# Patient Record
Sex: FEMALE | Race: OTHER | NOT HISPANIC OR LATINO
[De-identification: names, ages, dates, MRNs, and addresses within clinical notes are randomized per-mention and may not be internally consistent; named-entity substitution may affect disease eponyms.]

---

## 2017-10-10 PROBLEM — Z00.00 ENCOUNTER FOR PREVENTIVE HEALTH EXAMINATION: Status: ACTIVE | Noted: 2017-10-10

## 2017-10-11 ENCOUNTER — APPOINTMENT (OUTPATIENT)
Dept: CARDIOLOGY | Facility: CLINIC | Age: 82
End: 2017-10-11
Payer: MEDICAID

## 2017-10-11 VITALS
HEIGHT: 61 IN | HEART RATE: 72 BPM | SYSTOLIC BLOOD PRESSURE: 171 MMHG | OXYGEN SATURATION: 98 % | BODY MASS INDEX: 23.03 KG/M2 | WEIGHT: 122 LBS | RESPIRATION RATE: 14 BRPM | DIASTOLIC BLOOD PRESSURE: 74 MMHG

## 2017-10-11 PROCEDURE — 36415 COLL VENOUS BLD VENIPUNCTURE: CPT

## 2017-10-11 PROCEDURE — 93000 ELECTROCARDIOGRAM COMPLETE: CPT

## 2017-10-11 PROCEDURE — 99205 OFFICE O/P NEW HI 60 MIN: CPT

## 2017-10-11 RX ORDER — GEMFIBROZIL 600 MG/1
600 TABLET, FILM COATED ORAL
Qty: 60 | Refills: 0 | Status: ACTIVE | COMMUNITY
Start: 2017-10-11

## 2017-10-11 RX ORDER — AMLODIPINE BESYLATE 5 MG/1
5 TABLET ORAL
Qty: 30 | Refills: 5 | Status: DISCONTINUED | COMMUNITY
Start: 2017-10-11 | End: 2017-10-11

## 2017-10-12 ENCOUNTER — MEDICATION RENEWAL (OUTPATIENT)
Age: 82
End: 2017-10-12

## 2017-10-12 ENCOUNTER — RESULT REVIEW (OUTPATIENT)
Age: 82
End: 2017-10-12

## 2017-10-12 LAB
ALBUMIN SERPL ELPH-MCNC: 4.3 G/DL
ALP BLD-CCNC: 91 U/L
ALT SERPL-CCNC: 17 U/L
ANION GAP SERPL CALC-SCNC: 19 MMOL/L
AST SERPL-CCNC: 27 U/L
BILIRUB SERPL-MCNC: 0.4 MG/DL
BUN SERPL-MCNC: 15 MG/DL
CALCIUM SERPL-MCNC: 9.7 MG/DL
CHLORIDE SERPL-SCNC: 101 MMOL/L
CO2 SERPL-SCNC: 21 MMOL/L
CREAT SERPL-MCNC: 0.67 MG/DL
GLUCOSE SERPL-MCNC: 88 MG/DL
POTASSIUM SERPL-SCNC: 3.9 MMOL/L
PROT SERPL-MCNC: 7.9 G/DL
SODIUM SERPL-SCNC: 141 MMOL/L
TSH SERPL-ACNC: 1.57 UIU/ML

## 2017-10-13 ENCOUNTER — RESULT REVIEW (OUTPATIENT)
Age: 82
End: 2017-10-13

## 2017-10-13 LAB — NT-PROBNP SERPL-MCNC: 48 PG/ML

## 2017-11-06 ENCOUNTER — APPOINTMENT (OUTPATIENT)
Dept: CV DIAGNOSITCS | Facility: HOSPITAL | Age: 82
End: 2017-11-06

## 2017-11-06 ENCOUNTER — APPOINTMENT (OUTPATIENT)
Dept: CARDIOLOGY | Facility: CLINIC | Age: 82
End: 2017-11-06
Payer: MEDICAID

## 2017-11-06 VITALS
SYSTOLIC BLOOD PRESSURE: 162 MMHG | WEIGHT: 293 LBS | DIASTOLIC BLOOD PRESSURE: 77 MMHG | HEIGHT: 61 IN | RESPIRATION RATE: 14 BRPM | BODY MASS INDEX: 55.32 KG/M2 | HEART RATE: 71 BPM | OXYGEN SATURATION: 99 %

## 2017-11-06 VITALS — WEIGHT: 120 LBS | BODY MASS INDEX: 22.67 KG/M2

## 2017-11-06 PROCEDURE — 99214 OFFICE O/P EST MOD 30 MIN: CPT

## 2017-11-06 RX ORDER — VALSARTAN AND HYDROCHLOROTHIAZIDE 160; 25 MG/1; MG/1
160-25 TABLET, FILM COATED ORAL DAILY
Qty: 30 | Refills: 3 | Status: DISCONTINUED | COMMUNITY
Start: 2017-10-11 | End: 2017-11-06

## 2017-12-21 ENCOUNTER — APPOINTMENT (OUTPATIENT)
Dept: CARDIOLOGY | Facility: CLINIC | Age: 82
End: 2017-12-21
Payer: MEDICAID

## 2017-12-21 VITALS
RESPIRATION RATE: 14 BRPM | DIASTOLIC BLOOD PRESSURE: 80 MMHG | OXYGEN SATURATION: 97 % | BODY MASS INDEX: 22.84 KG/M2 | WEIGHT: 121 LBS | SYSTOLIC BLOOD PRESSURE: 180 MMHG | HEIGHT: 61 IN | HEART RATE: 70 BPM

## 2017-12-21 PROCEDURE — 93000 ELECTROCARDIOGRAM COMPLETE: CPT

## 2017-12-21 PROCEDURE — 99214 OFFICE O/P EST MOD 30 MIN: CPT

## 2018-02-01 ENCOUNTER — APPOINTMENT (OUTPATIENT)
Dept: CARDIOLOGY | Facility: CLINIC | Age: 83
End: 2018-02-01

## 2018-03-01 ENCOUNTER — APPOINTMENT (OUTPATIENT)
Dept: CARDIOLOGY | Facility: CLINIC | Age: 83
End: 2018-03-01
Payer: MEDICAID

## 2018-03-01 VITALS
WEIGHT: 118 LBS | HEIGHT: 61 IN | SYSTOLIC BLOOD PRESSURE: 169 MMHG | DIASTOLIC BLOOD PRESSURE: 78 MMHG | HEART RATE: 61 BPM | OXYGEN SATURATION: 96 % | BODY MASS INDEX: 22.28 KG/M2

## 2018-03-01 DIAGNOSIS — R06.02 SHORTNESS OF BREATH: ICD-10-CM

## 2018-03-01 PROCEDURE — 99214 OFFICE O/P EST MOD 30 MIN: CPT

## 2018-03-01 RX ORDER — METOPROLOL TARTRATE 50 MG/1
50 TABLET, FILM COATED ORAL DAILY
Refills: 0 | Status: DISCONTINUED | COMMUNITY
Start: 2017-11-06 | End: 2018-03-01

## 2018-06-04 ENCOUNTER — NON-APPOINTMENT (OUTPATIENT)
Age: 83
End: 2018-06-04

## 2018-06-04 ENCOUNTER — APPOINTMENT (OUTPATIENT)
Dept: CARDIOLOGY | Facility: CLINIC | Age: 83
End: 2018-06-04
Payer: MEDICAID

## 2018-06-04 VITALS
SYSTOLIC BLOOD PRESSURE: 168 MMHG | DIASTOLIC BLOOD PRESSURE: 79 MMHG | HEART RATE: 68 BPM | OXYGEN SATURATION: 98 % | WEIGHT: 115 LBS | BODY MASS INDEX: 21.71 KG/M2 | HEIGHT: 61 IN

## 2018-06-04 VITALS — SYSTOLIC BLOOD PRESSURE: 150 MMHG | DIASTOLIC BLOOD PRESSURE: 68 MMHG

## 2018-06-04 PROCEDURE — 99214 OFFICE O/P EST MOD 30 MIN: CPT

## 2018-06-04 PROCEDURE — 93000 ELECTROCARDIOGRAM COMPLETE: CPT

## 2018-09-10 ENCOUNTER — NON-APPOINTMENT (OUTPATIENT)
Age: 83
End: 2018-09-10

## 2018-09-10 ENCOUNTER — APPOINTMENT (OUTPATIENT)
Dept: CARDIOLOGY | Facility: CLINIC | Age: 83
End: 2018-09-10
Payer: MEDICAID

## 2018-09-10 VITALS
BODY MASS INDEX: 22.58 KG/M2 | WEIGHT: 115 LBS | DIASTOLIC BLOOD PRESSURE: 80 MMHG | OXYGEN SATURATION: 97 % | SYSTOLIC BLOOD PRESSURE: 170 MMHG | HEART RATE: 73 BPM | RESPIRATION RATE: 16 BRPM | HEIGHT: 60 IN | TEMPERATURE: 97.5 F

## 2018-09-10 VITALS — DIASTOLIC BLOOD PRESSURE: 79 MMHG | SYSTOLIC BLOOD PRESSURE: 129 MMHG

## 2018-09-10 PROCEDURE — 99214 OFFICE O/P EST MOD 30 MIN: CPT

## 2018-09-10 PROCEDURE — 93000 ELECTROCARDIOGRAM COMPLETE: CPT

## 2018-11-27 ENCOUNTER — MEDICATION RENEWAL (OUTPATIENT)
Age: 83
End: 2018-11-27

## 2019-03-18 ENCOUNTER — APPOINTMENT (OUTPATIENT)
Age: 84
End: 2019-03-18

## 2019-03-18 ENCOUNTER — APPOINTMENT (OUTPATIENT)
Age: 84
End: 2019-03-18
Payer: MEDICAID

## 2019-03-18 ENCOUNTER — NON-APPOINTMENT (OUTPATIENT)
Age: 84
End: 2019-03-18

## 2019-03-18 ENCOUNTER — APPOINTMENT (OUTPATIENT)
Dept: CARDIOLOGY | Facility: CLINIC | Age: 84
End: 2019-03-18
Payer: MEDICAID

## 2019-03-18 ENCOUNTER — OUTPATIENT (OUTPATIENT)
Dept: OUTPATIENT SERVICES | Facility: HOSPITAL | Age: 84
LOS: 1 days | End: 2019-03-18
Payer: MEDICAID

## 2019-03-18 VITALS
HEART RATE: 56 BPM | WEIGHT: 113.25 LBS | HEIGHT: 60 IN | DIASTOLIC BLOOD PRESSURE: 79 MMHG | BODY MASS INDEX: 22.23 KG/M2 | SYSTOLIC BLOOD PRESSURE: 201 MMHG | OXYGEN SATURATION: 100 %

## 2019-03-18 VITALS — SYSTOLIC BLOOD PRESSURE: 201 MMHG | DIASTOLIC BLOOD PRESSURE: 94 MMHG

## 2019-03-18 DIAGNOSIS — R42 DIZZINESS AND GIDDINESS: ICD-10-CM

## 2019-03-18 DIAGNOSIS — Z86.39 PERSONAL HISTORY OF OTHER ENDOCRINE, NUTRITIONAL AND METABOLIC DISEASE: ICD-10-CM

## 2019-03-18 DIAGNOSIS — R00.1 BRADYCARDIA, UNSPECIFIED: ICD-10-CM

## 2019-03-18 DIAGNOSIS — K21.9 GASTRO-ESOPHAGEAL REFLUX DISEASE W/OUT ESOPHAGITIS: ICD-10-CM

## 2019-03-18 DIAGNOSIS — Z87.39 PERSONAL HISTORY OF OTHER DISEASES OF THE MUSCULOSKELETAL SYSTEM AND CONNECTIVE TISSUE: ICD-10-CM

## 2019-03-18 PROCEDURE — 99214 OFFICE O/P EST MOD 30 MIN: CPT

## 2019-03-18 PROCEDURE — 93227 XTRNL ECG REC<48 HR R&I: CPT

## 2019-03-18 PROCEDURE — 93000 ELECTROCARDIOGRAM COMPLETE: CPT

## 2019-03-18 PROCEDURE — 36415 COLL VENOUS BLD VENIPUNCTURE: CPT

## 2019-03-18 PROCEDURE — 99205 OFFICE O/P NEW HI 60 MIN: CPT

## 2019-03-18 NOTE — PHYSICAL EXAM
[General Appearance - Well Developed] : well developed [Normal Appearance] : normal appearance [Well Groomed] : well groomed [General Appearance - Well Nourished] : well nourished [No Deformities] : no deformities [General Appearance - In No Acute Distress] : no acute distress [Normal Conjunctiva] : the conjunctiva exhibited no abnormalities [Eyelids - No Xanthelasma] : the eyelids demonstrated no xanthelasmas [No Oral Pallor] : no oral pallor [Normal Oral Mucosa] : normal oral mucosa [No Oral Cyanosis] : no oral cyanosis [Normal Jugular Venous A Waves Present] : normal jugular venous A waves present [Normal Jugular Venous V Waves Present] : normal jugular venous V waves present [No Jugular Venous Correa A Waves] : no jugular venous correa A waves [Heart Sounds] : normal S1 and S2 [Heart Rate And Rhythm] : heart rate and rhythm were normal [Murmurs] : no murmurs present [Respiration, Rhythm And Depth] : normal respiratory rhythm and effort [Exaggerated Use Of Accessory Muscles For Inspiration] : no accessory muscle use [Abdomen Soft] : soft [Auscultation Breath Sounds / Voice Sounds] : lungs were clear to auscultation bilaterally [Abdomen Tenderness] : non-tender [Abdomen Mass (___ Cm)] : no abdominal mass palpated [Gait - Sufficient For Exercise Testing] : the gait was sufficient for exercise testing [Abnormal Walk] : normal gait [Nail Clubbing] : no clubbing of the fingernails [Cyanosis, Localized] : no localized cyanosis [Petechial Hemorrhages (___cm)] : no petechial hemorrhages [Skin Color & Pigmentation] : normal skin color and pigmentation [] : no rash [Skin Lesions] : no skin lesions [No Venous Stasis] : no venous stasis [No Skin Ulcers] : no skin ulcer [No Xanthoma] : no  xanthoma was observed [Oriented To Time, Place, And Person] : oriented to person, place, and time [Affect] : the affect was normal [No Anxiety] : not feeling anxious [Mood] : the mood was normal

## 2019-03-18 NOTE — REVIEW OF SYSTEMS
[Dyspnea on exertion] : dyspnea during exertion [Feeling Fatigued] : feeling fatigued [Dizziness] : dizziness [Negative] : Heme/Lymph

## 2019-03-18 NOTE — HISTORY OF PRESENT ILLNESS
[FreeTextEntry1] : Brandin Doe MD\par \par Kat Ruiz is an 84y/o woman with Hx of HTN, HLD, hypothyroidism, all of which are stable, arthritis, GERD, and new onset dizziness who presents today for initial evaluation. Currently in office with symptoms of dizziness while seeing the Heart Failure team. EKG at time shows marked sinus bradycardia with sinus pause. Also notes worsening dyspnea on exertion, unsure if secondary to heart failure (awaiting recent Echo results from primary) or possible chronotropic incompetence. Denies chest pain, palpitations, SOB at rest, syncope or near syncope. \par

## 2019-03-19 VITALS — HEART RATE: 48 BPM | SYSTOLIC BLOOD PRESSURE: 172 MMHG | DIASTOLIC BLOOD PRESSURE: 80 MMHG

## 2019-03-19 PROBLEM — R42 DIZZINESS: Status: ACTIVE | Noted: 2019-03-18

## 2019-03-19 NOTE — REVIEW OF SYSTEMS
[Negative] : Integumentary [see HPI] : see HPI [Recent Weight Loss (___ Lbs)] : recent [unfilled] ~Ulb weight loss [Dyspnea on exertion] : dyspnea during exertion [Dizziness] : dizziness [Shortness Of Breath] : no shortness of breath [FreeTextEntry1] : constipation

## 2019-03-19 NOTE — DISCUSSION/SUMMARY
[Patient] : the patient [___ Week(s)] : [unfilled] week(s) [FreeTextEntry2] : son [FreeTextEntry1] : 1. Sinus bradycardia- EKG with marked sinus bradycardia with sinus pause/exit block and reports new onset of dizziness. EP appreciated and arranged for a 24 hour Holter monitor to R/O bradyarrhythmia, may need a PPM pacement for sinus node dysfunction/symptomatic bradycardia\par \par 2. Dyspnea on exertion\par - will repeat TTE to assess valvular function and LVEF, will obtain 2017 TTE from primary Dr. Swift\par \par 3. Hypertension- uncontrolled with elevated readings. Pt was taking amlodipine 5 mg, not 7.5 as per son\par - Discussed with Dr. Doe and will add losartan 25 mg daily and will follow up for B/P check with Dr. Swift and in office after testing.\par \par 4. Hypothyroid-  on levothyroxine 50 mcg daily, will follow up for recheck.  WIll follow up with primary to check TSH\par \par Follow up after testing and EP will call to discuss Holter results and need for PPM.\par \par Follow up in 6 months\par \par \par

## 2019-03-19 NOTE — HISTORY OF PRESENT ILLNESS
[FreeTextEntry1] : Kat Ruiz is an 86 y/o female with a PMHx of HTN, HLD, hypothyroid, arthritis and acid reflux. No surgical history. No history of MI or stroke. She speaks Wesley/ Gonzalo, but the son helped translate.  Referred to HF clinic from Dr. Sandra Laurent.\par \par TTE 11/6/17 \par \par Previously,she was started on Valsartan 160mg/HCTZ 25 mg daily  but stopped after a few days due to headaches she associated with the medication and she went back to amlodipine 7.5 mg daily and metoprolol tartrate 50 mg daily . \par \par Pt is here for follow up today. She states she has been feeling well. Her initial B/P was 201/94 HR 56 and with recheck it was 172/80 with HR 45-55bpm and she took her amlodipine 5 mg this morning. Pt has been taking 5 mg, not 7.5 mg. Her son translates and she says she has had new onset of dizziness and has had increased dyspnea on exertion walking room to room in the house. Previously, she could walk up a flight of stairs and could walk without dyspnea. She uses 1 pillow for sleep.   No orthopnea (she likes to lie flat w/o a pillow). She reports normal bowel and bladder function. Denies PND/orthopnea,, palpitations and no syncope/falls. EKG today shows marked sinus bradycardia rate 48 with a sinus pause and pt was referred to and was seen by EP today Dr. Negro.

## 2019-03-19 NOTE — PHYSICAL EXAM
[General Appearance - Well Developed] : well developed [Normal Appearance] : normal appearance [Well Groomed] : well groomed [No Deformities] : no deformities [General Appearance - In No Acute Distress] : no acute distress [Normal Conjunctiva] : the conjunctiva exhibited no abnormalities [Normal Oral Mucosa] : normal oral mucosa [No Oral Pallor] : no oral pallor [No Oral Cyanosis] : no oral cyanosis [Normal Jugular Venous A Waves Present] : normal jugular venous A waves present [Normal Jugular Venous V Waves Present] : normal jugular venous V waves present [No Jugular Venous Correa A Waves] : no jugular venous correa A waves [Respiration, Rhythm And Depth] : normal respiratory rhythm and effort [Exaggerated Use Of Accessory Muscles For Inspiration] : no accessory muscle use [Auscultation Breath Sounds / Voice Sounds] : lungs were clear to auscultation bilaterally [Heart Sounds] : normal S1 and S2 [Edema] : no peripheral edema present [Bowel Sounds] : normal bowel sounds [Abdomen Soft] : soft [Abdomen Tenderness] : non-tender [Abnormal Walk] : normal gait [Nail Clubbing] : no clubbing of the fingernails [Cyanosis, Localized] : no localized cyanosis [Skin Color & Pigmentation] : normal skin color and pigmentation [] : no rash [No Venous Stasis] : no venous stasis [Skin Lesions] : no skin lesions [No Skin Ulcers] : no skin ulcer [No Xanthoma] : no  xanthoma was observed [Oriented To Time, Place, And Person] : oriented to person, place, and time [Affect] : the affect was normal [FreeTextEntry1] : no edema

## 2019-03-19 NOTE — ASSESSMENT
[FreeTextEntry1] : 83 y/o female with PMHx of HTN, HLD, hypothyroid, arthritis and acid reflux. No surgical history. No history of MI or stroke. She had a TTE  11/6/17 ( Dr. Swift)\par Appears euvolemic but hypertensive with B/P 201/79 to 172/80 and new onset of ROBLERO and dizziness

## 2019-03-20 LAB
ALBUMIN SERPL ELPH-MCNC: 4.3 G/DL
ALP BLD-CCNC: 92 U/L
ALT SERPL-CCNC: 13 U/L
ANION GAP SERPL CALC-SCNC: 17 MMOL/L
AST SERPL-CCNC: 21 U/L
BASOPHILS # BLD AUTO: 0.02 K/UL
BASOPHILS NFR BLD AUTO: 0.4 %
BILIRUB SERPL-MCNC: 0.4 MG/DL
BUN SERPL-MCNC: 12 MG/DL
CALCIUM SERPL-MCNC: 9.2 MG/DL
CHLORIDE SERPL-SCNC: 98 MMOL/L
CO2 SERPL-SCNC: 24 MMOL/L
CREAT SERPL-MCNC: 0.59 MG/DL
EOSINOPHIL # BLD AUTO: 0.07 K/UL
EOSINOPHIL NFR BLD AUTO: 1.3 %
GLUCOSE SERPL-MCNC: 69 MG/DL
HCT VFR BLD CALC: 40.4 %
HGB BLD-MCNC: 12.6 G/DL
IMM GRANULOCYTES NFR BLD AUTO: 0.2 %
LYMPHOCYTES # BLD AUTO: 1.6 K/UL
LYMPHOCYTES NFR BLD AUTO: 30.5 %
MAGNESIUM SERPL-MCNC: 2.2 MG/DL
MAN DIFF?: NORMAL
MCHC RBC-ENTMCNC: 30.1 PG
MCHC RBC-ENTMCNC: 31.2 GM/DL
MCV RBC AUTO: 96.7 FL
MONOCYTES # BLD AUTO: 0.61 K/UL
MONOCYTES NFR BLD AUTO: 11.6 %
NEUTROPHILS # BLD AUTO: 2.94 K/UL
NEUTROPHILS NFR BLD AUTO: 56 %
NT-PROBNP SERPL-MCNC: 167 PG/ML
PLATELET # BLD AUTO: 382 K/UL
POTASSIUM SERPL-SCNC: 4.5 MMOL/L
PROT SERPL-MCNC: 7.2 G/DL
RBC # BLD: 4.18 M/UL
RBC # FLD: 14.2 %
SODIUM SERPL-SCNC: 139 MMOL/L
TSH SERPL-ACNC: 1.37 UIU/ML
WBC # FLD AUTO: 5.25 K/UL

## 2019-04-04 ENCOUNTER — NON-APPOINTMENT (OUTPATIENT)
Age: 84
End: 2019-04-04

## 2019-04-15 ENCOUNTER — APPOINTMENT (OUTPATIENT)
Dept: CARDIOLOGY | Facility: CLINIC | Age: 84
End: 2019-04-15
Payer: MEDICAID

## 2019-04-15 VITALS
DIASTOLIC BLOOD PRESSURE: 84 MMHG | HEART RATE: 75 BPM | SYSTOLIC BLOOD PRESSURE: 157 MMHG | OXYGEN SATURATION: 98 % | HEIGHT: 60 IN | BODY MASS INDEX: 22.28 KG/M2 | WEIGHT: 113.5 LBS

## 2019-04-15 DIAGNOSIS — I45.5 OTHER SPECIFIED HEART BLOCK: ICD-10-CM

## 2019-04-15 PROCEDURE — 99214 OFFICE O/P EST MOD 30 MIN: CPT

## 2019-04-15 PROCEDURE — 93000 ELECTROCARDIOGRAM COMPLETE: CPT

## 2019-04-15 NOTE — ASSESSMENT
[FreeTextEntry1] : 85 y/o female with PMHx of HTN, HLD, hypothyroid, arthritis and acid reflux. No surgical history. No history of MI or stroke. She had a TTE  11/6/17 ( Dr. Swift)\par Appears euvolemic but hypertensive with B/P 157/84, improved since last visit with no further ROBLERO and decreased episodes of dizziness

## 2019-04-15 NOTE — HISTORY OF PRESENT ILLNESS
[FreeTextEntry1] : Kat Ruiz is an 84 y/o female with a PMHx of HTN, HLD, hypothyroid, arthritis and acid reflux. No surgical history. No history of MI or stroke. She speaks Wesley/ Gonzalo, but the son helped translate.  Referred to HF clinic from Dr. Sandra Laurent. TTE with primary 8/1/2018 with primary with LVEF 65%, mildly dilated LA\par \par Previously,she was started on Valsartan 160mg/HCTZ 25 mg daily  but stopped after a few days due to headaches she associated with the medication and she went back to amlodipine 7.5 mg daily and metoprolol tartrate 50 mg daily . \par \par Pt is here for follow up today and was seen with Dr. Doe. Last visit on 3/1/8/19 her B/P was 201/94 HR 56 and with recheck it was 172/80 with taking her amlodipine 5 mg  HR 45-55 bpm with marked bradycardia with sinus exit block on EKG and she was seen by EP Dr. Negro who ordered a Holter monitor. Her son translated and said she  had new onset of dizziness and has had increased dyspnea on exertion walking room to room in the house but is no longer having the symptoms since the last visit. Holter with HR 29-75 with sinus bradycardia with sinus exit block, rare PAC's and AT. Prior TTE with primary 8/1/2018 with primary with LVEF 65%, mildly dilated LA . She is walking 2 miles a day in the morning and can walk up a flight of stairs without dyspnea. She uses 1 pillow for sleep.   No orthopnea (she likes to lie flat w/o a pillow). She reports normal bowel and bladder function. Denies PND/orthopnea,, palpitations and no syncope/falls. EKG today with NSR 72 with RBBB from prior 3/18 with marked sinus bradycardia rate 48 with a sinus pause. B/P today 157/84.

## 2019-04-15 NOTE — PHYSICAL EXAM
[General Appearance - Well Developed] : well developed [Normal Appearance] : normal appearance [Well Groomed] : well groomed [No Deformities] : no deformities [General Appearance - In No Acute Distress] : no acute distress [No Oral Pallor] : no oral pallor [Normal Oral Mucosa] : normal oral mucosa [Normal Conjunctiva] : the conjunctiva exhibited no abnormalities [No Oral Cyanosis] : no oral cyanosis [Normal Jugular Venous A Waves Present] : normal jugular venous A waves present [Normal Jugular Venous V Waves Present] : normal jugular venous V waves present [No Jugular Venous Correa A Waves] : no jugular venous correa A waves [Respiration, Rhythm And Depth] : normal respiratory rhythm and effort [Auscultation Breath Sounds / Voice Sounds] : lungs were clear to auscultation bilaterally [Exaggerated Use Of Accessory Muscles For Inspiration] : no accessory muscle use [Edema] : no peripheral edema present [Heart Sounds] : normal S1 and S2 [Abdomen Soft] : soft [Bowel Sounds] : normal bowel sounds [Abdomen Tenderness] : non-tender [Abnormal Walk] : normal gait [Nail Clubbing] : no clubbing of the fingernails [Cyanosis, Localized] : no localized cyanosis [] : no rash [Skin Color & Pigmentation] : normal skin color and pigmentation [No Venous Stasis] : no venous stasis [No Skin Ulcers] : no skin ulcer [Skin Lesions] : no skin lesions [No Xanthoma] : no  xanthoma was observed [Oriented To Time, Place, And Person] : oriented to person, place, and time [Affect] : the affect was normal [Heart Rate And Rhythm] : heart rate and rhythm were normal [FreeTextEntry1] : Speaks Gonzalo

## 2019-04-15 NOTE — DISCUSSION/SUMMARY
[Patient] : the patient [___ Month(s)] : [unfilled] month(s) [FreeTextEntry2] : son [FreeTextEntry1] : 1. Sinus bradycardia- EKG today with NSR 72 with prior EKG with marked sinus bradycardia with sinus pause/exit block and reports new onset of dizziness. EP appreciated and arranged for a 24 hour Holter monitor. Pt feels much better since last visit with no ROBLERO and decreased dizziness and will not have a PPM at this time. Son will call if she has any recurrence of her prior symptoms or if she has any falls.\par \par 2. TTE  obtained from primary Dr. Swift 8/11/19 with LVEF 65%, mild dilated LA \par \par 3. Hypertension- B/p today improved from last visit and is 157/84 today on  Pt was taking amlodipine 5 mg and losartan 25 mg daily.\par - will increase losartan to 50 mg daily and will have a B/P check with her primary Dr. Swift\par \par 4. Hypothyroid-  on levothyroxine 50 mcg daily\par \par Follow up in 3 months or sooner if she has recurrence of symptoms.\par \par \par

## 2019-05-01 ENCOUNTER — MEDICATION RENEWAL (OUTPATIENT)
Age: 84
End: 2019-05-01

## 2019-07-15 ENCOUNTER — APPOINTMENT (OUTPATIENT)
Dept: CARDIOLOGY | Facility: CLINIC | Age: 84
End: 2019-07-15
Payer: MEDICAID

## 2019-07-15 ENCOUNTER — NON-APPOINTMENT (OUTPATIENT)
Age: 84
End: 2019-07-15

## 2019-07-15 VITALS
HEART RATE: 54 BPM | OXYGEN SATURATION: 97 % | SYSTOLIC BLOOD PRESSURE: 208 MMHG | WEIGHT: 116.25 LBS | DIASTOLIC BLOOD PRESSURE: 90 MMHG | HEIGHT: 60 IN | BODY MASS INDEX: 22.82 KG/M2

## 2019-07-15 VITALS — DIASTOLIC BLOOD PRESSURE: 90 MMHG | SYSTOLIC BLOOD PRESSURE: 204 MMHG

## 2019-07-15 PROCEDURE — 99214 OFFICE O/P EST MOD 30 MIN: CPT

## 2019-07-15 PROCEDURE — 93000 ELECTROCARDIOGRAM COMPLETE: CPT

## 2019-07-15 NOTE — HISTORY OF PRESENT ILLNESS
[FreeTextEntry1] : Kat Ruiz is an 86 y/o female with a PMHx of HTN, HLD, hypothyroid, arthritis and acid reflux. No surgical history. No history of MI or stroke. She speaks Wesley/ Gonzalo, but the son helped translate.  Referred to HF clinic from Dr. Sandra Laurent. TTE with primary 8/1/2018 with primary with LVEF 65%, mildly dilated LA\par \par Previously,she was started on Valsartan 160mg/HCTZ 25 mg daily  but stopped after a few days due to headaches she associated with the medication and she went back to amlodipine 7.5 mg daily and metoprolol tartrate 50 mg daily . \par \par Pt is here for follow up today. She brought in her meds and has been taking; metoprolol 100 mg daily, amlodipine 10 mg daily, losartan 50 mg daily. Her B/P today is 204/90 and HR is 54 bpm. Previously, she had an EKG with 45-55 bpm with marked bradycardia with sinus exit block on EKG and she was seen by EP Dr. Negro who ordered a Holter monitor with HR 29-75 with sinus bradycardia with sinus exit block, rare PAC's and AT. Prior TTE with primary 8/1/2018 with primary with LVEF 65%, mildly dilated LA . She is walking 2 miles a day in the morning and can walk up a flight of stairs without dyspnea. She uses 1 pillow for sleep.   No orthopnea (she likes to lie flat w/o a pillow). She reports normal bowel and bladder function. Denies PND/orthopnea,, palpitations and no syncope/falls.

## 2019-07-15 NOTE — PHYSICAL EXAM
[General Appearance - Well Developed] : well developed [Normal Appearance] : normal appearance [Well Groomed] : well groomed [No Deformities] : no deformities [General Appearance - In No Acute Distress] : no acute distress [Normal Conjunctiva] : the conjunctiva exhibited no abnormalities [Normal Oral Mucosa] : normal oral mucosa [No Oral Pallor] : no oral pallor [No Oral Cyanosis] : no oral cyanosis [Normal Jugular Venous A Waves Present] : normal jugular venous A waves present [Normal Jugular Venous V Waves Present] : normal jugular venous V waves present [No Jugular Venous Correa A Waves] : no jugular venous correa A waves [Respiration, Rhythm And Depth] : normal respiratory rhythm and effort [Exaggerated Use Of Accessory Muscles For Inspiration] : no accessory muscle use [Auscultation Breath Sounds / Voice Sounds] : lungs were clear to auscultation bilaterally [Heart Rate And Rhythm] : heart rate and rhythm were normal [Heart Sounds] : normal S1 and S2 [Edema] : no peripheral edema present [Bowel Sounds] : normal bowel sounds [Abdomen Soft] : soft [Abdomen Tenderness] : non-tender [Abnormal Walk] : normal gait [Nail Clubbing] : no clubbing of the fingernails [Cyanosis, Localized] : no localized cyanosis [Skin Color & Pigmentation] : normal skin color and pigmentation [] : no rash [No Venous Stasis] : no venous stasis [Skin Lesions] : no skin lesions [No Skin Ulcers] : no skin ulcer [No Xanthoma] : no  xanthoma was observed [Oriented To Time, Place, And Person] : oriented to person, place, and time [Affect] : the affect was normal [FreeTextEntry1] : Speaks [place]

## 2019-07-15 NOTE — ASSESSMENT
[FreeTextEntry1] : 83 y/o female with PMHx of HTN, HLD, hypothyroid, arthritis and acid reflux. No surgical history. No history of MI or stroke. She had a TTE  11/6/17 ( Dr. Swift)\par Appears euvolemic but hypertensive with B/P 204/90 HR 54 bpm

## 2019-07-15 NOTE — DISCUSSION/SUMMARY
[Patient] : the patient [___ Week(s)] : [unfilled] week(s) [FreeTextEntry2] : son [FreeTextEntry1] : 1. Sinus bradycardia- EKG today with sinus bradycardia rate 54, RBBB\par - will d/c metoprolol succinate 100 mg daily\par \par 2. TTE  obtained from primary Dr. Swift 8/11/19 with LVEF 65%, mild dilated LA \par \par 3. Hypertension- B/p elevated today 204/90\par -  will continue taking amlodipine 10 mg daily\par - increase losartan to 100 mg daily from 50 mg daily and will recheck labs and B/P with her primary Dr. Swift next week.\par - start hydralazine 25 mg bid, goal B/P < 130/80\par \par 4. Hypothyroid-  on levothyroxine 50 mcg daily\par \par Follow up in one week with primary for B/P check and labs and in office in 4-6 weeks. \par \par \par

## 2019-07-15 NOTE — REVIEW OF SYSTEMS
[see HPI] : see HPI [Negative] : Integumentary [Recent Weight Gain (___ Lbs)] : recent [unfilled] ~Ulb weight gain [Shortness Of Breath] : no shortness of breath

## 2019-09-19 ENCOUNTER — APPOINTMENT (OUTPATIENT)
Dept: CV DIAGNOSITCS | Facility: HOSPITAL | Age: 84
End: 2019-09-19
Payer: MEDICAID

## 2019-09-19 ENCOUNTER — NON-APPOINTMENT (OUTPATIENT)
Age: 84
End: 2019-09-19

## 2019-09-19 ENCOUNTER — APPOINTMENT (OUTPATIENT)
Dept: CARDIOLOGY | Facility: CLINIC | Age: 84
End: 2019-09-19
Payer: MEDICAID

## 2019-09-19 ENCOUNTER — OUTPATIENT (OUTPATIENT)
Dept: OUTPATIENT SERVICES | Facility: HOSPITAL | Age: 84
LOS: 1 days | End: 2019-09-19

## 2019-09-19 VITALS
HEART RATE: 60 BPM | BODY MASS INDEX: 24.54 KG/M2 | OXYGEN SATURATION: 97 % | HEIGHT: 60 IN | SYSTOLIC BLOOD PRESSURE: 170 MMHG | DIASTOLIC BLOOD PRESSURE: 84 MMHG | RESPIRATION RATE: 16 BRPM | WEIGHT: 125 LBS

## 2019-09-19 VITALS — SYSTOLIC BLOOD PRESSURE: 154 MMHG | DIASTOLIC BLOOD PRESSURE: 74 MMHG

## 2019-09-19 DIAGNOSIS — I10 ESSENTIAL (PRIMARY) HYPERTENSION: ICD-10-CM

## 2019-09-19 PROCEDURE — 93306 TTE W/DOPPLER COMPLETE: CPT | Mod: 26

## 2019-09-19 PROCEDURE — 99214 OFFICE O/P EST MOD 30 MIN: CPT

## 2019-09-19 PROCEDURE — 93000 ELECTROCARDIOGRAM COMPLETE: CPT

## 2019-09-19 RX ORDER — MELOXICAM 7.5 MG/1
7.5 TABLET ORAL
Refills: 0 | Status: DISCONTINUED | COMMUNITY
Start: 2019-09-19 | End: 2019-09-19

## 2019-09-19 NOTE — ASSESSMENT
[FreeTextEntry1] : 86 y/o female with PMHx of HTN, HLD, hypothyroid, arthritis and acid reflux. No surgical history. No history of MI or stroke. She had a TTE  8/11/18 ( Dr. Swift) with LVEF 65% with stage 1 diastolic dysfunction.\par Appears euvolemic but hypertensive with B/P 170/84 to 154/74 HR 60 bpm

## 2019-09-19 NOTE — DISCUSSION/SUMMARY
[Patient] : the patient [___ Month(s)] : [unfilled] month(s) [FreeTextEntry2] : grand daughter [FreeTextEntry1] : 1. Prior Sinus bradycardia- EKG today with NSR 60, RBBB\par - pt with improvement in rates off metoprolol succinate 100 mg daily\par \par 2. TTE  obtained from primary Dr. Swift 8/11/19 with LVEF 65%, mild dilated LA \par \par 3. Hypertension- B/p elevated today 170/84 to 154/74\par -  will continue taking amlodipine 10 mg daily\par - restart losartan at 50 mg daily at night, not taking 100 mg daily due to headaches \par - will check labs and B/P with her primary Dr. Swift \par - continue  hydralazine 50 mg tid, goal B/P < 130/80. May up titrate to 75 mg or 100 mg if unable to tolerate the losartan\par \par 4. Hypothyroid-  on levothyroxine 50 mcg daily\par \par Follow up with primary for B/P check and in this office in 3 months\par \par \par

## 2019-09-19 NOTE — HISTORY OF PRESENT ILLNESS
[FreeTextEntry1] : Kat Ruiz is an 86 y/o female with a PMHx of HTN, HLD, hypothyroid, arthritis and acid reflux. No surgical history. No history of MI or stroke. She speaks Wesley/ Gonz[place], but the son helped translate.  Referred to HF clinic from Dr. Sandra Laurent. TTE with primary 8/1/2018 with primary with LVEF 65%, mildly dilated LA. Previously, she had an EKG with 45-55 bpm with marked bradycardia with sinus exit block on EKG and she was seen by EP Dr. Negro and ordered a Holter monitor with HR 29-75 with sinus bradycardia with sinus exit block, rare PAC's and AT.\par \par Previously,she was started on Valsartan 160mg/HCTZ 25 mg daily  but stopped after a few days due to headaches she associated with the medication and she went back to amlodipine 7.5 mg daily and metoprolol tartrate 50 mg daily . \par \par Pt is here for follow up today. She brought in her meds and has been taking; metoprolol 100 mg daily, amlodipine 10 mg daily, hydralazine 50 mg tid and has not been taking losartan 100 mg daily because of headaches. Currently, her B/P is 170/84 with a recheck of 154/74. She is walking 2 miles a day in the morning and can walk up a flight of stairs without dyspnea. She uses 1 pillow for sleep.   No orthopnea (she likes to lie flat w/o a pillow). She reports normal bowel and bladder function. Denies PND/orthopnea,, palpitations and no syncope/falls.

## 2019-12-19 ENCOUNTER — APPOINTMENT (OUTPATIENT)
Dept: CARDIOLOGY | Facility: CLINIC | Age: 84
End: 2019-12-19
Payer: MEDICAID

## 2019-12-19 ENCOUNTER — NON-APPOINTMENT (OUTPATIENT)
Age: 84
End: 2019-12-19

## 2019-12-19 VITALS
HEART RATE: 74 BPM | WEIGHT: 115 LBS | HEIGHT: 60 IN | SYSTOLIC BLOOD PRESSURE: 125 MMHG | OXYGEN SATURATION: 100 % | BODY MASS INDEX: 22.58 KG/M2 | DIASTOLIC BLOOD PRESSURE: 56 MMHG

## 2019-12-19 PROCEDURE — 36415 COLL VENOUS BLD VENIPUNCTURE: CPT

## 2019-12-19 PROCEDURE — 93000 ELECTROCARDIOGRAM COMPLETE: CPT

## 2019-12-19 PROCEDURE — 99214 OFFICE O/P EST MOD 30 MIN: CPT

## 2019-12-19 RX ORDER — AMLODIPINE BESYLATE 10 MG/1
10 TABLET ORAL
Qty: 90 | Refills: 2 | Status: DISCONTINUED | COMMUNITY
Start: 2017-11-06 | End: 2019-12-19

## 2019-12-19 RX ORDER — PANTOPRAZOLE 20 MG/1
20 TABLET, DELAYED RELEASE ORAL
Refills: 0 | Status: ACTIVE | COMMUNITY
Start: 2017-10-11

## 2019-12-19 RX ORDER — LOSARTAN POTASSIUM 100 MG/1
100 TABLET, FILM COATED ORAL
Qty: 90 | Refills: 2 | Status: ACTIVE | COMMUNITY
Start: 2019-03-18

## 2019-12-19 NOTE — ASSESSMENT
[FreeTextEntry1] : 86 y/o female with PMHx of HTN, HLD, hypothyroid, arthritis and acid reflux. No surgical history. No history of MI or stroke. She had a TTE  8/11/18 ( Dr. Swift) with LVEF 65% with stage 1 diastolic dysfunction.\par Appears euvolemic and normotensive on current meds

## 2019-12-19 NOTE — REVIEW OF SYSTEMS
[see HPI] : see HPI [Negative] : Integumentary [Recent Weight Loss (___ Lbs)] : recent [unfilled] ~Ulb weight loss [Shortness Of Breath] : no shortness of breath

## 2019-12-19 NOTE — HISTORY OF PRESENT ILLNESS
[FreeTextEntry1] : Kat Ruiz is an 84 y/o female with a PMHx of HTN, HLD, hypothyroid, arthritis and acid reflux. No surgical history. No history of MI or stroke. She speaks Wesley/ Gonz[place], but the son helped translate.  Referred to HF clinic from Dr. Sandra Laurent. TTE with primary 8/1/2018 with primary with LVEF 65%, mildly dilated LA. Previously, she had an EKG with 45-55 bpm with marked bradycardia with sinus exit block on EKG and she was seen by EP Dr. Negro and ordered a Holter monitor with HR 29-75 with sinus bradycardia with sinus exit block, rare PAC's and AT. Pt is here for a follow up today.\par \par Previously,she was started on Valsartan 160mg/HCTZ 25 mg daily  but stopped after a few days due to headaches she associated with the medication and she went back to amlodipine 7.5 mg daily and metoprolol tartrate 50 mg daily . \par \par Pt is here for follow up today. She brought in her meds and has been taking; metoprolol 100 mg daily,  hydralazine 50 mg bid, not tid and  losartan 100 mg daily . Currently, her B/P is 125/56.  She walks in her home room to room without dyspnea. She can climb a flight of stairs but has some mild dyspnea after. She uses 1 pillow for sleep with no orthopnea (she likes to lie flat w/o a pillow). She reports normal bowel and bladder function but did have some recent blood in stool x 1 day. Denies PND/orthopnea,, palpitations and no syncope/falls.

## 2019-12-19 NOTE — DISCUSSION/SUMMARY
[Patient] : the patient [___ Month(s)] : [unfilled] month(s) [FreeTextEntry2] : grand daughter [FreeTextEntry1] : 1. Prior Sinus bradycardia- EKG today with NSR 74, RBBB\par - pt with improvement in rates off metoprolol succinate 100 mg daily\par \par 2. TTE  obtained from primary Dr. Swift 8/11/19 with LVEF 65%, mild dilated LA \par \par 3. Hypertension- B/p stable 125/56\par -  pt is off amlodipine 10 mg daily\par -  continue losartan at 100 mg daily 50 mg daily \par - continue hydralazine 50 mg  bid, was not taking tid,  goal B/P < 130/80.\par - will check labs today and will send to primary Dr. Swift \par - obtain recent echocardiogram from primary\par - continue  hydralazine 50 mg tid,\par \par 4. Hypothyroid-  on levothyroxine 50 mcg daily\par - check TSH\par \par Follow up with primary and in this office in 4 months\par \par \par

## 2019-12-19 NOTE — PHYSICAL EXAM
[General Appearance - Well Developed] : well developed [Well Groomed] : well groomed [Normal Appearance] : normal appearance [No Deformities] : no deformities [General Appearance - In No Acute Distress] : no acute distress [Normal Conjunctiva] : the conjunctiva exhibited no abnormalities [Normal Oral Mucosa] : normal oral mucosa [No Oral Pallor] : no oral pallor [No Oral Cyanosis] : no oral cyanosis [Normal Jugular Venous A Waves Present] : normal jugular venous A waves present [Normal Jugular Venous V Waves Present] : normal jugular venous V waves present [No Jugular Venous Correa A Waves] : no jugular venous correa A waves [Respiration, Rhythm And Depth] : normal respiratory rhythm and effort [Auscultation Breath Sounds / Voice Sounds] : lungs were clear to auscultation bilaterally [Exaggerated Use Of Accessory Muscles For Inspiration] : no accessory muscle use [Heart Sounds] : normal S1 and S2 [Heart Rate And Rhythm] : heart rate and rhythm were normal [Edema] : no peripheral edema present [Bowel Sounds] : normal bowel sounds [Abdomen Soft] : soft [Abdomen Tenderness] : non-tender [Abnormal Walk] : normal gait [Nail Clubbing] : no clubbing of the fingernails [Cyanosis, Localized] : no localized cyanosis [Skin Color & Pigmentation] : normal skin color and pigmentation [] : no rash [No Venous Stasis] : no venous stasis [Skin Lesions] : no skin lesions [No Xanthoma] : no  xanthoma was observed [No Skin Ulcers] : no skin ulcer [Oriented To Time, Place, And Person] : oriented to person, place, and time [Affect] : the affect was normal [FreeTextEntry1] : Speaks [place]

## 2019-12-20 LAB
ALBUMIN SERPL ELPH-MCNC: 4.2 G/DL
ALP BLD-CCNC: 84 U/L
ALT SERPL-CCNC: 23 U/L
ANION GAP SERPL CALC-SCNC: 12 MMOL/L
AST SERPL-CCNC: 25 U/L
BASOPHILS # BLD AUTO: 0.01 K/UL
BASOPHILS NFR BLD AUTO: 0.3 %
BILIRUB SERPL-MCNC: 0.3 MG/DL
BUN SERPL-MCNC: 16 MG/DL
CALCIUM SERPL-MCNC: 9.1 MG/DL
CHLORIDE SERPL-SCNC: 98 MMOL/L
CHOLEST SERPL-MCNC: 176 MG/DL
CHOLEST/HDLC SERPL: 1.9 RATIO
CO2 SERPL-SCNC: 22 MMOL/L
CREAT SERPL-MCNC: 0.56 MG/DL
EOSINOPHIL # BLD AUTO: 0.02 K/UL
EOSINOPHIL NFR BLD AUTO: 0.5 %
GLUCOSE SERPL-MCNC: 129 MG/DL
HCT VFR BLD CALC: 29.9 %
HDLC SERPL-MCNC: 91 MG/DL
HGB BLD-MCNC: 9.8 G/DL
IMM GRANULOCYTES NFR BLD AUTO: 0.3 %
LDLC SERPL CALC-MCNC: 68 MG/DL
LYMPHOCYTES # BLD AUTO: 0.85 K/UL
LYMPHOCYTES NFR BLD AUTO: 23 %
MAN DIFF?: NORMAL
MCHC RBC-ENTMCNC: 27.8 PG
MCHC RBC-ENTMCNC: 32.8 GM/DL
MCV RBC AUTO: 84.9 FL
MONOCYTES # BLD AUTO: 0.64 K/UL
MONOCYTES NFR BLD AUTO: 17.3 %
NEUTROPHILS # BLD AUTO: 2.16 K/UL
NEUTROPHILS NFR BLD AUTO: 58.6 %
NT-PROBNP SERPL-MCNC: 82 PG/ML
PLATELET # BLD AUTO: 383 K/UL
POTASSIUM SERPL-SCNC: 4.3 MMOL/L
PROT SERPL-MCNC: 7.3 G/DL
RBC # BLD: 3.52 M/UL
RBC # FLD: 14 %
SODIUM SERPL-SCNC: 132 MMOL/L
TRIGL SERPL-MCNC: 87 MG/DL
TSH SERPL-ACNC: 2.04 UIU/ML
WBC # FLD AUTO: 3.69 K/UL

## 2020-01-08 ENCOUNTER — OUTPATIENT (OUTPATIENT)
Dept: OUTPATIENT SERVICES | Facility: HOSPITAL | Age: 85
LOS: 1 days | Discharge: HOME | End: 2020-01-08

## 2020-01-08 DIAGNOSIS — D64.9 ANEMIA, UNSPECIFIED: ICD-10-CM

## 2020-01-13 ENCOUNTER — RESULT REVIEW (OUTPATIENT)
Age: 85
End: 2020-01-13

## 2020-01-13 ENCOUNTER — APPOINTMENT (OUTPATIENT)
Dept: HEMATOLOGY ONCOLOGY | Facility: CLINIC | Age: 85
End: 2020-01-13
Payer: MEDICAID

## 2020-01-13 VITALS
OXYGEN SATURATION: 94 % | HEIGHT: 55.63 IN | BODY MASS INDEX: 26.16 KG/M2 | TEMPERATURE: 97.6 F | DIASTOLIC BLOOD PRESSURE: 77 MMHG | SYSTOLIC BLOOD PRESSURE: 204 MMHG | HEART RATE: 68 BPM | RESPIRATION RATE: 17 BRPM | WEIGHT: 114.64 LBS

## 2020-01-13 LAB
BASOPHILS # BLD AUTO: 0 K/UL — SIGNIFICANT CHANGE UP (ref 0–0.2)
BASOPHILS NFR BLD AUTO: 0.6 % — SIGNIFICANT CHANGE UP (ref 0–2)
EOSINOPHIL # BLD AUTO: 0.1 K/UL — SIGNIFICANT CHANGE UP (ref 0–0.5)
EOSINOPHIL NFR BLD AUTO: 1.4 % — SIGNIFICANT CHANGE UP (ref 0–6)
HCT VFR BLD CALC: 28.1 % — LOW (ref 34.5–45)
HGB BLD-MCNC: 9.2 G/DL — LOW (ref 11.5–15.5)
LYMPHOCYTES # BLD AUTO: 1 K/UL — SIGNIFICANT CHANGE UP (ref 1–3.3)
LYMPHOCYTES # BLD AUTO: 20.4 % — SIGNIFICANT CHANGE UP (ref 13–44)
MCHC RBC-ENTMCNC: 26.8 PG — LOW (ref 27–34)
MCHC RBC-ENTMCNC: 32.7 G/DL — SIGNIFICANT CHANGE UP (ref 32–36)
MCV RBC AUTO: 82.2 FL — SIGNIFICANT CHANGE UP (ref 80–100)
MONOCYTES # BLD AUTO: 0.8 K/UL — SIGNIFICANT CHANGE UP (ref 0–0.9)
MONOCYTES NFR BLD AUTO: 16.1 % — HIGH (ref 2–14)
NEUTROPHILS # BLD AUTO: 2.9 K/UL — SIGNIFICANT CHANGE UP (ref 1.8–7.4)
NEUTROPHILS NFR BLD AUTO: 61.5 % — SIGNIFICANT CHANGE UP (ref 43–77)
PLATELET # BLD AUTO: 381 K/UL — SIGNIFICANT CHANGE UP (ref 150–400)
RBC # BLD: 3.42 M/UL — LOW (ref 3.8–5.2)
RBC # FLD: 13.4 % — SIGNIFICANT CHANGE UP (ref 10.3–14.5)
RETICS #: 68.3 K/UL — SIGNIFICANT CHANGE UP (ref 25–125)
RETICS/RBC NFR: 1.9 % — SIGNIFICANT CHANGE UP (ref 0.5–2.5)
WBC # BLD: 4.7 K/UL — SIGNIFICANT CHANGE UP (ref 3.8–10.5)
WBC # FLD AUTO: 4.7 K/UL — SIGNIFICANT CHANGE UP (ref 3.8–10.5)

## 2020-01-13 PROCEDURE — 99205 OFFICE O/P NEW HI 60 MIN: CPT

## 2020-01-13 NOTE — HISTORY OF PRESENT ILLNESS
[de-identified] : 86 y/o [place] female with a past medical history of hypertension, hyperlipidemia, hypothyroidism who is here for an evaluation of anemia.  She has a new anemia found on labs last month.  H/H 3/18/19 Hg 5.25 Hg 12.6 Hct 40.4 Plt 382, in December 12/19 WBC 3.69 Hg 9.8 Hct 29.9 Plt 383, MCV 85.  Her history was taken with her granddaughter at her side.  She notes that she has had bright blood with her bowel movements for the last few months but states that it has resolved for the last couple of weeks.  She has dyspnea on exertion as well for the last couple of months as well.  No chest pain, no SOB at rest.  She denies any abdominal pain, no n/v/d/c, no change in her bowel habits, no new medications.  Her appetite is good, no weight loss, she is a vegetarian, eats mainly [place] food.  Her last creatinine in 12/20/19 was 0.56.  She never had a colonoscopy.  Labs completed in 2010 revealed iron deficiency.  Her granddaughter states she does not like to take her medications at times.  She takes meloxicam regularly for arthritis in her knee.

## 2020-01-13 NOTE — ASSESSMENT
[FreeTextEntry1] : This is an 85 year old female with a worsening microcytic anemia for the last couple of months, suspect it is due to GI blood loss +/- dietary as her hemoglobin was normal in March 2019.  She has a history of GI loss, though she believes it be resolved her guaiac is still positive. \par Her hemoglobin is 9.1 today, would not transfuse her as her BP is not controlled and she is minimally symptomatic. \par Check her retic count, LDH, haptoglobin, iron studies, B12, folic acid. \par Recommend beginning SloFe daily with vitamin C on an empty stomach.  If she is unable to tolerate it, they will call. \par Recommend GI evaluation, ?need for scope.\par She will continue protonix daily, would avoid NSAIDS/meloxicam.  Tylenol as needed for pain. \par Her BP is not controlled, she has a cuff at home.  Recommend close monitoring and follow up with her primary care physician/cardiology. \par D/w Dr. Laurent, her PCP.  \par She will follow up in 4-6 weeks.

## 2020-01-13 NOTE — REVIEW OF SYSTEMS
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

## 2020-01-13 NOTE — CONSULT LETTER
[Dear  ___] : Dear  [unfilled], [Please see my note below.] : Please see my note below. [Consult Letter:] : I had the pleasure of evaluating your patient, [unfilled]. [Consult Closing:] : Thank you very much for allowing me to participate in the care of this patient.  If you have any questions, please do not hesitate to contact me. [Sincerely,] : Sincerely, [FreeTextEntry3] : Leydi Woodward D.O.\par  of Medicine\par Hematology/Oncology \par [facility]\par [facility]\par [address]\[place] [address]\par Tel: [phone]\par Fax: [phone]\par \par

## 2020-02-08 ENCOUNTER — OUTPATIENT (OUTPATIENT)
Dept: OUTPATIENT SERVICES | Facility: HOSPITAL | Age: 85
LOS: 1 days | Discharge: HOME | End: 2020-02-08

## 2020-02-08 DIAGNOSIS — D64.9 ANEMIA, UNSPECIFIED: ICD-10-CM

## 2020-02-13 ENCOUNTER — APPOINTMENT (OUTPATIENT)
Dept: GASTROENTEROLOGY | Facility: CLINIC | Age: 85
End: 2020-02-13

## 2020-02-27 ENCOUNTER — APPOINTMENT (OUTPATIENT)
Dept: GASTROENTEROLOGY | Facility: CLINIC | Age: 85
End: 2020-02-27

## 2020-03-20 ENCOUNTER — OUTPATIENT (OUTPATIENT)
Dept: OUTPATIENT SERVICES | Facility: HOSPITAL | Age: 85
LOS: 1 days | Discharge: HOME | End: 2020-03-20

## 2020-03-20 DIAGNOSIS — D64.9 ANEMIA, UNSPECIFIED: ICD-10-CM

## 2020-03-26 ENCOUNTER — APPOINTMENT (OUTPATIENT)
Dept: HEMATOLOGY ONCOLOGY | Facility: CLINIC | Age: 85
End: 2020-03-26

## 2020-04-11 LAB
FERRITIN SERPL-MCNC: 9 NG/ML
FOLATE SERPL-MCNC: 19.2 NG/ML
HAPTOGLOB SERPL-MCNC: 181 MG/DL
IRON SATN MFR SERPL: 3 %
IRON SERPL-MCNC: 15 UG/DL
LDH SERPL-CCNC: 208 U/L
TIBC SERPL-MCNC: 493 UG/DL
UIBC SERPL-MCNC: 478 UG/DL
VIT B12 SERPL-MCNC: 1640 PG/ML

## 2020-04-23 ENCOUNTER — APPOINTMENT (OUTPATIENT)
Dept: CARDIOLOGY | Facility: CLINIC | Age: 85
End: 2020-04-23

## 2022-01-31 ENCOUNTER — APPOINTMENT (OUTPATIENT)
Dept: CARDIOLOGY | Facility: CLINIC | Age: 87
End: 2022-01-31
Payer: MEDICAID

## 2022-01-31 VITALS — BODY MASS INDEX: 23.63 KG/M2 | WEIGHT: 104 LBS

## 2022-01-31 VITALS
BODY MASS INDEX: 23.63 KG/M2 | HEIGHT: 55.63 IN | DIASTOLIC BLOOD PRESSURE: 52 MMHG | SYSTOLIC BLOOD PRESSURE: 143 MMHG | OXYGEN SATURATION: 99 % | HEART RATE: 48 BPM

## 2022-01-31 DIAGNOSIS — R00.1 BRADYCARDIA, UNSPECIFIED: ICD-10-CM

## 2022-01-31 DIAGNOSIS — K21.9 GASTRO-ESOPHAGEAL REFLUX DISEASE W/OUT ESOPHAGITIS: ICD-10-CM

## 2022-01-31 DIAGNOSIS — D64.9 ANEMIA, UNSPECIFIED: ICD-10-CM

## 2022-01-31 DIAGNOSIS — R06.00 DYSPNEA, UNSPECIFIED: ICD-10-CM

## 2022-01-31 DIAGNOSIS — I10 ESSENTIAL (PRIMARY) HYPERTENSION: ICD-10-CM

## 2022-01-31 LAB
25(OH)D3 SERPL-MCNC: 29.1 NG/ML
ALBUMIN SERPL ELPH-MCNC: 4.1 G/DL
ALP BLD-CCNC: 81 U/L
ALT SERPL-CCNC: 14 U/L
ANION GAP SERPL CALC-SCNC: 13 MMOL/L
AST SERPL-CCNC: 22 U/L
BASOPHILS # BLD AUTO: 0.01 K/UL
BASOPHILS NFR BLD AUTO: 0.3 %
BILIRUB SERPL-MCNC: 0.4 MG/DL
BUN SERPL-MCNC: 12 MG/DL
CALCIUM SERPL-MCNC: 9.1 MG/DL
CHLORIDE SERPL-SCNC: 93 MMOL/L
CHOLEST SERPL-MCNC: 140 MG/DL
CO2 SERPL-SCNC: 20 MMOL/L
CREAT SERPL-MCNC: 0.61 MG/DL
EOSINOPHIL # BLD AUTO: 0.02 K/UL
EOSINOPHIL NFR BLD AUTO: 0.5 %
ESTIMATED AVERAGE GLUCOSE: 131 MG/DL
HBA1C MFR BLD HPLC: 6.2 %
HCT VFR BLD CALC: 34.6 %
HDLC SERPL-MCNC: 32 MG/DL
HGB BLD-MCNC: 11.6 G/DL
IMM GRANULOCYTES NFR BLD AUTO: 0.5 %
LDLC SERPL CALC-MCNC: 77 MG/DL
LYMPHOCYTES # BLD AUTO: 0.75 K/UL
LYMPHOCYTES NFR BLD AUTO: 20.5 %
MAGNESIUM SERPL-MCNC: 1.9 MG/DL
MAN DIFF?: NORMAL
MCHC RBC-ENTMCNC: 29.1 PG
MCHC RBC-ENTMCNC: 33.5 GM/DL
MCV RBC AUTO: 86.7 FL
MONOCYTES # BLD AUTO: 0.3 K/UL
MONOCYTES NFR BLD AUTO: 8.2 %
NEUTROPHILS # BLD AUTO: 2.55 K/UL
NEUTROPHILS NFR BLD AUTO: 70 %
NONHDLC SERPL-MCNC: 108 MG/DL
NT-PROBNP SERPL-MCNC: 481 PG/ML
PLATELET # BLD AUTO: 316 K/UL
POTASSIUM SERPL-SCNC: 4 MMOL/L
PROT SERPL-MCNC: 7.7 G/DL
RBC # BLD: 3.99 M/UL
RBC # FLD: 15.6 %
SODIUM SERPL-SCNC: 126 MMOL/L
TRIGL SERPL-MCNC: 156 MG/DL
TSH SERPL-ACNC: 2.53 UIU/ML
WBC # FLD AUTO: 3.65 K/UL

## 2022-01-31 PROCEDURE — 36415 COLL VENOUS BLD VENIPUNCTURE: CPT

## 2022-01-31 PROCEDURE — 93000 ELECTROCARDIOGRAM COMPLETE: CPT

## 2022-01-31 PROCEDURE — 99214 OFFICE O/P EST MOD 30 MIN: CPT

## 2022-01-31 RX ORDER — SODIUM SULFATE, POTASSIUM SULFATE, MAGNESIUM SULFATE 17.5; 3.13; 1.6 G/ML; G/ML; G/ML
17.5-3.13-1.6 SOLUTION, CONCENTRATE ORAL
Qty: 1 | Refills: 0 | Status: DISCONTINUED | COMMUNITY
Start: 2020-01-15 | End: 2022-01-31

## 2022-01-31 RX ORDER — DOCUSATE SODIUM 100 MG/1
100 CAPSULE ORAL
Qty: 90 | Refills: 3 | Status: DISCONTINUED | COMMUNITY
Start: 2019-12-19 | End: 2022-01-31

## 2022-01-31 RX ORDER — MELOXICAM 7.5 MG/1
7.5 TABLET ORAL
Refills: 0 | Status: DISCONTINUED | COMMUNITY
Start: 2017-11-06 | End: 2022-01-31

## 2022-01-31 RX ORDER — FUROSEMIDE 20 MG/1
20 TABLET ORAL
Qty: 30 | Refills: 3 | Status: ACTIVE | COMMUNITY
Start: 2022-01-31

## 2022-01-31 RX ORDER — AMLODIPINE BESYLATE 5 MG/1
5 TABLET ORAL DAILY
Qty: 30 | Refills: 3 | Status: ACTIVE | COMMUNITY
Start: 2022-01-31

## 2022-01-31 RX ORDER — METOPROLOL SUCCINATE 100 MG/1
100 TABLET, EXTENDED RELEASE ORAL DAILY
Refills: 3 | Status: DISCONTINUED | COMMUNITY
Start: 2018-03-01 | End: 2022-01-31

## 2022-01-31 RX ORDER — HYDRALAZINE HYDROCHLORIDE 50 MG/1
50 TABLET ORAL
Qty: 180 | Refills: 3 | Status: ACTIVE | COMMUNITY
Start: 2019-07-15

## 2022-01-31 RX ORDER — LEVOTHYROXINE SODIUM 0.05 MG/1
50 TABLET ORAL
Qty: 30 | Refills: 0 | Status: ACTIVE | COMMUNITY
Start: 2017-10-11

## 2022-01-31 NOTE — ASSESSMENT
[FreeTextEntry1] : 84 y/o female with PMHx of HTN, HLD, hypothyroid, arthritis and acid reflux. No surgical history. No history of MI or stroke. She had a TTE  8/11/18 ( Dr. Swift) with LVEF 65% with stage 1 diastolic dysfunction. 9/19/19 TTE LVIDD 3.6 cm, LVEF 70%\par Appears euvolemic and with B/P 143/52 and HR 48 bpm on metoprolol 100 mg daily

## 2022-01-31 NOTE — CARDIOLOGY SUMMARY
[de-identified] : 1/31/22 SInus bradycardia RBBB. NSST\par 12/19/19 NSR 74, RBBB, NSST\par 9/19/19 NSR 60, RBBB, NSST\par 7/15/19 Sinus bradycardia 54, RBBB, NSST\par  [de-identified] : 9/19/19 TTE LVIDD 3.6 cm, LVEF 70%\par 8/11/18 TTE with LVEF65%, mild LVH, normal LV systolic function, grade 1 diastolic dysfunction, trace MR, trace TR \par

## 2022-01-31 NOTE — HISTORY OF PRESENT ILLNESS
[FreeTextEntry1] : Kat Ruiz is an 88 y/o female with a PMHx of HTN, HLD, hypothyroid, arthritis and acid reflux. No surgical history. No history of MI or stroke. She speaks [place]/ [place], but the son helped translate.  Referred to HF clinic from Dr. Sandra Laurent. TTE with primary 8/1/2018 with primary with LVEF 65%, mildly dilated LA. Previously, she had an EKG with 45-55 bpm with marked bradycardia with sinus exit block on EKG and she was seen by EP Dr. Negro and ordered a Holter monitor with HR 29-75 with sinus bradycardia with sinus exit block, rare PAC's and AT. Last  9/19/19 TTE LVIDD 3.6 cm, LVEF 70%. Pt is here for a follow up today secondary to shortness of breath/ hand and feet swelling, last seen in office on 12/20/19.\par \par Previously,she was started on Valsartan 160mg/HCTZ 25 mg daily  but stopped after a few days due to headaches she associated with the medication and she went back to amlodipine 7.5 mg daily and metoprolol tartrate 50 mg daily . \par \par Pt is here for follow up today with her grand daughter. Last visit in 12/2019, metoprolol 100 mg daily was stopped due to bradycardia. Her grand daughter states she started some old meds about a month ago including metoprolol. She went to PCP because she was having some swelling in her hands and feet that improved with taking furosemide 20 mg twice.  She is taking hydralazine 50 mg tid and  losartan 100 mg daily . Currently, her B/P is 143/52 and HR 48 bpm.  She had chest discomfort one night that she attributes to reflux. She walks in her home room to room without dyspnea. She can climb a flight of stairs but has some mild dyspnea after. She uses 1 pillow for sleep with no orthopnea (she likes to lie flat w/o a pillow). She reports normal bowel and bladder function.  Denies PND/orthopnea, palpitations and no syncope/falls. 9/19/19 TTE LVIDD 3.6 cm, LVEF 70%.

## 2022-01-31 NOTE — DISCUSSION/SUMMARY
[Patient] : the patient [___ Week(s)] : in [unfilled] week(s) [FreeTextEntry2] : grand daughter [FreeTextEntry1] : 1. Prior Sinus bradycardia- EKG today with sinus rk 48, NSST\par - pt previously with improvement in rates off metoprolol succinate 100 mg daily\par - will d/c metoprolol and will followup in \par \par 2. TTE  obtained from primary Dr. Swift 8/11/19 with LVEF 65%, mild dilated LA with repeat 9/19/19 TTE LVIDD 3.6 cm, LVEF 70%\par \par 3. Hypertension- B/p stable 143/52\par -  continue amlodipine 5 mg daily\par -  continue losartan at 100 mg daily \par - continue hydralazine 50 mg  tid,  goal B/P < 130/80.\par - will check labs today and will send to primary Dr. Swift \par \par 4. Hypothyroid-  on levothyroxine 50 mcg daily\par - check TSH\par \par Follow up in 2 weeks to see if HR is improved off metoprolol, will have pt follow up with EP if still bradycardic and will get Biotel monitor to evaluate. WIll call with labs\par \par \par

## 2022-01-31 NOTE — PHYSICAL EXAM
[No Acute Distress] : no acute distress [Normal Conjunctiva] : normal conjunctiva [Normal S1, S2] : normal S1, S2 [Clear Lung Fields] : clear lung fields [Good Air Entry] : good air entry [Soft] : abdomen soft [Non Tender] : non-tender [No Edema] : no edema [No Rash] : no rash [Moves all extremities] : moves all extremities [Normal] : alert and oriented, normal memory [de-identified] : Elderly female [de-identified] : JVP 7 cm  [de-identified] : rk  [de-identified] : slow gait

## 2022-02-14 ENCOUNTER — APPOINTMENT (OUTPATIENT)
Dept: CARDIOLOGY | Facility: CLINIC | Age: 87
End: 2022-02-14